# Patient Record
Sex: FEMALE | Race: WHITE
[De-identification: names, ages, dates, MRNs, and addresses within clinical notes are randomized per-mention and may not be internally consistent; named-entity substitution may affect disease eponyms.]

---
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
## 2024-04-01 ENCOUNTER — HOSPITAL ENCOUNTER (INPATIENT)
Facility: HOSPITAL | Age: 19
LOS: 4 days | Discharge: HOME | DRG: 885 | End: 2024-04-05
Attending: PSYCHIATRY & NEUROLOGY | Admitting: PSYCHIATRY & NEUROLOGY
Payer: MEDICAID

## 2024-04-01 ENCOUNTER — HOSPITAL ENCOUNTER (EMERGENCY)
Facility: HOSPITAL | Age: 19
Discharge: PSYCHIATRIC OR SUBSTANCE USE FACILITY | DRG: 885 | End: 2024-04-01
Attending: STUDENT IN AN ORGANIZED HEALTH CARE EDUCATION/TRAINING PROGRAM | Admitting: STUDENT IN AN ORGANIZED HEALTH CARE EDUCATION/TRAINING PROGRAM
Payer: MEDICAID

## 2024-04-01 ENCOUNTER — APPOINTMENT (OUTPATIENT)
Dept: CT IMAGING | Facility: HOSPITAL | Age: 19
DRG: 885 | End: 2024-04-01
Payer: MEDICAID

## 2024-04-01 VITALS
DIASTOLIC BLOOD PRESSURE: 89 MMHG | HEART RATE: 107 BPM | HEIGHT: 65 IN | WEIGHT: 170 LBS | BODY MASS INDEX: 28.32 KG/M2 | TEMPERATURE: 98.4 F | OXYGEN SATURATION: 97 % | RESPIRATION RATE: 17 BRPM | SYSTOLIC BLOOD PRESSURE: 128 MMHG

## 2024-04-01 DIAGNOSIS — F32.89 OTHER DEPRESSION: Primary | ICD-10-CM

## 2024-04-01 PROBLEM — R45.851 SUICIDAL IDEATIONS: Status: ACTIVE | Noted: 2024-04-01

## 2024-04-01 LAB
ALBUMIN SERPL-MCNC: 4.8 G/DL (ref 3.5–5.2)
ALBUMIN/GLOB SERPL: 1.5 G/DL
ALP SERPL-CCNC: 74 U/L (ref 39–117)
ALT SERPL W P-5'-P-CCNC: 29 U/L (ref 1–33)
AMPHET+METHAMPHET UR QL: NEGATIVE
AMPHETAMINES UR QL: NEGATIVE
ANION GAP SERPL CALCULATED.3IONS-SCNC: 14.9 MMOL/L (ref 5–15)
AST SERPL-CCNC: 20 U/L (ref 1–32)
BACTERIA UR QL AUTO: NORMAL /HPF
BARBITURATES UR QL SCN: NEGATIVE
BASOPHILS # BLD AUTO: 0.05 10*3/MM3 (ref 0–0.2)
BASOPHILS NFR BLD AUTO: 0.5 % (ref 0–1.5)
BENZODIAZ UR QL SCN: POSITIVE
BILIRUB SERPL-MCNC: 0.2 MG/DL (ref 0–1.2)
BILIRUB UR QL STRIP: NEGATIVE
BUN SERPL-MCNC: 11 MG/DL (ref 6–20)
BUN/CREAT SERPL: 16.2 (ref 7–25)
BUPRENORPHINE SERPL-MCNC: NEGATIVE NG/ML
CALCIUM SPEC-SCNC: 9.6 MG/DL (ref 8.6–10.5)
CANNABINOIDS SERPL QL: POSITIVE
CHLORIDE SERPL-SCNC: 104 MMOL/L (ref 98–107)
CLARITY UR: CLEAR
CO2 SERPL-SCNC: 22.1 MMOL/L (ref 22–29)
COCAINE UR QL: NEGATIVE
COLOR UR: YELLOW
CREAT SERPL-MCNC: 0.68 MG/DL (ref 0.57–1)
DEPRECATED RDW RBC AUTO: 50.8 FL (ref 37–54)
EGFRCR SERPLBLD CKD-EPI 2021: 128.8 ML/MIN/1.73
EOSINOPHIL # BLD AUTO: 0.04 10*3/MM3 (ref 0–0.4)
EOSINOPHIL NFR BLD AUTO: 0.4 % (ref 0.3–6.2)
ERYTHROCYTE [DISTWIDTH] IN BLOOD BY AUTOMATED COUNT: 15.5 % (ref 12.3–15.4)
ETHANOL BLD-MCNC: <10 MG/DL (ref 0–10)
ETHANOL UR QL: <0.01 %
FENTANYL UR-MCNC: NEGATIVE NG/ML
GLOBULIN UR ELPH-MCNC: 3.1 GM/DL
GLUCOSE BLDC GLUCOMTR-MCNC: 131 MG/DL (ref 70–130)
GLUCOSE SERPL-MCNC: 124 MG/DL (ref 65–99)
GLUCOSE UR STRIP-MCNC: NEGATIVE MG/DL
HAV IGM SERPL QL IA: NORMAL
HBV CORE IGM SERPL QL IA: NORMAL
HBV SURFACE AG SERPL QL IA: NORMAL
HCG SERPL QL: NEGATIVE
HCT VFR BLD AUTO: 39.3 % (ref 34–46.6)
HCV AB SER DONR QL: NORMAL
HGB BLD-MCNC: 12.5 G/DL (ref 12–15.9)
HGB UR QL STRIP.AUTO: ABNORMAL
HYALINE CASTS UR QL AUTO: NORMAL /LPF
IMM GRANULOCYTES # BLD AUTO: 0.03 10*3/MM3 (ref 0–0.05)
IMM GRANULOCYTES NFR BLD AUTO: 0.3 % (ref 0–0.5)
KETONES UR QL STRIP: NEGATIVE
LEUKOCYTE ESTERASE UR QL STRIP.AUTO: NEGATIVE
LYMPHOCYTES # BLD AUTO: 1.55 10*3/MM3 (ref 0.7–3.1)
LYMPHOCYTES NFR BLD AUTO: 14.7 % (ref 19.6–45.3)
MAGNESIUM SERPL-MCNC: 1.9 MG/DL (ref 1.7–2.2)
MCH RBC QN AUTO: 28.3 PG (ref 26.6–33)
MCHC RBC AUTO-ENTMCNC: 31.8 G/DL (ref 31.5–35.7)
MCV RBC AUTO: 89.1 FL (ref 79–97)
METHADONE UR QL SCN: NEGATIVE
MONOCYTES # BLD AUTO: 0.51 10*3/MM3 (ref 0.1–0.9)
MONOCYTES NFR BLD AUTO: 4.8 % (ref 5–12)
NEUTROPHILS NFR BLD AUTO: 79.3 % (ref 42.7–76)
NEUTROPHILS NFR BLD AUTO: 8.37 10*3/MM3 (ref 1.7–7)
NITRITE UR QL STRIP: NEGATIVE
NRBC BLD AUTO-RTO: 0 /100 WBC (ref 0–0.2)
OPIATES UR QL: NEGATIVE
OXYCODONE UR QL SCN: NEGATIVE
PCP UR QL SCN: NEGATIVE
PH UR STRIP.AUTO: 6 [PH] (ref 5–8)
PLATELET # BLD AUTO: 454 10*3/MM3 (ref 140–450)
PMV BLD AUTO: 9.8 FL (ref 6–12)
POTASSIUM SERPL-SCNC: 3.2 MMOL/L (ref 3.5–5.2)
PROT SERPL-MCNC: 7.9 G/DL (ref 6–8.5)
PROT UR QL STRIP: NEGATIVE
RBC # BLD AUTO: 4.41 10*6/MM3 (ref 3.77–5.28)
RBC # UR STRIP: NORMAL /HPF
REF LAB TEST METHOD: NORMAL
SODIUM SERPL-SCNC: 141 MMOL/L (ref 136–145)
SP GR UR STRIP: 1.02 (ref 1–1.03)
SQUAMOUS #/AREA URNS HPF: NORMAL /HPF
TRICYCLICS UR QL SCN: NEGATIVE
UROBILINOGEN UR QL STRIP: ABNORMAL
WBC # UR STRIP: NORMAL /HPF
WBC NRBC COR # BLD AUTO: 10.55 10*3/MM3 (ref 3.4–10.8)

## 2024-04-01 PROCEDURE — 83036 HEMOGLOBIN GLYCOSYLATED A1C: CPT | Performed by: PSYCHIATRY & NEUROLOGY

## 2024-04-01 PROCEDURE — 80307 DRUG TEST PRSMV CHEM ANLYZR: CPT | Performed by: STUDENT IN AN ORGANIZED HEALTH CARE EDUCATION/TRAINING PROGRAM

## 2024-04-01 PROCEDURE — 82077 ASSAY SPEC XCP UR&BREATH IA: CPT | Performed by: STUDENT IN AN ORGANIZED HEALTH CARE EDUCATION/TRAINING PROGRAM

## 2024-04-01 PROCEDURE — 93010 ELECTROCARDIOGRAM REPORT: CPT | Performed by: SPECIALIST

## 2024-04-01 PROCEDURE — 81001 URINALYSIS AUTO W/SCOPE: CPT | Performed by: STUDENT IN AN ORGANIZED HEALTH CARE EDUCATION/TRAINING PROGRAM

## 2024-04-01 PROCEDURE — 80053 COMPREHEN METABOLIC PANEL: CPT | Performed by: STUDENT IN AN ORGANIZED HEALTH CARE EDUCATION/TRAINING PROGRAM

## 2024-04-01 PROCEDURE — 83735 ASSAY OF MAGNESIUM: CPT | Performed by: STUDENT IN AN ORGANIZED HEALTH CARE EDUCATION/TRAINING PROGRAM

## 2024-04-01 PROCEDURE — 80074 ACUTE HEPATITIS PANEL: CPT | Performed by: PSYCHIATRY & NEUROLOGY

## 2024-04-01 PROCEDURE — 84443 ASSAY THYROID STIM HORMONE: CPT | Performed by: PSYCHIATRY & NEUROLOGY

## 2024-04-01 PROCEDURE — 82948 REAGENT STRIP/BLOOD GLUCOSE: CPT

## 2024-04-01 PROCEDURE — 84703 CHORIONIC GONADOTROPIN ASSAY: CPT | Performed by: STUDENT IN AN ORGANIZED HEALTH CARE EDUCATION/TRAINING PROGRAM

## 2024-04-01 PROCEDURE — 99285 EMERGENCY DEPT VISIT HI MDM: CPT

## 2024-04-01 PROCEDURE — 85025 COMPLETE CBC W/AUTO DIFF WBC: CPT | Performed by: STUDENT IN AN ORGANIZED HEALTH CARE EDUCATION/TRAINING PROGRAM

## 2024-04-01 PROCEDURE — 70450 CT HEAD/BRAIN W/O DYE: CPT | Performed by: RADIOLOGY

## 2024-04-01 PROCEDURE — 93005 ELECTROCARDIOGRAM TRACING: CPT | Performed by: PSYCHIATRY & NEUROLOGY

## 2024-04-01 PROCEDURE — 36415 COLL VENOUS BLD VENIPUNCTURE: CPT

## 2024-04-01 PROCEDURE — 70450 CT HEAD/BRAIN W/O DYE: CPT

## 2024-04-01 RX ORDER — CEFDINIR 300 MG/1
300 CAPSULE ORAL 2 TIMES DAILY
COMMUNITY
End: 2024-04-05 | Stop reason: HOSPADM

## 2024-04-01 RX ORDER — HYDROXYZINE HYDROCHLORIDE 25 MG/1
25 TABLET, FILM COATED ORAL DAILY PRN
Status: CANCELLED | OUTPATIENT
Start: 2024-04-01

## 2024-04-01 RX ORDER — ACETAMINOPHEN 325 MG/1
650 TABLET ORAL EVERY 6 HOURS PRN
Status: DISCONTINUED | OUTPATIENT
Start: 2024-04-01 | End: 2024-04-05 | Stop reason: HOSPADM

## 2024-04-01 RX ORDER — LOPERAMIDE HYDROCHLORIDE 2 MG/1
2 CAPSULE ORAL
Status: DISCONTINUED | OUTPATIENT
Start: 2024-04-01 | End: 2024-04-05 | Stop reason: HOSPADM

## 2024-04-01 RX ORDER — ONDANSETRON 4 MG/1
4 TABLET, ORALLY DISINTEGRATING ORAL EVERY 6 HOURS PRN
Status: DISCONTINUED | OUTPATIENT
Start: 2024-04-01 | End: 2024-04-05 | Stop reason: HOSPADM

## 2024-04-01 RX ORDER — CEFDINIR 300 MG/1
300 CAPSULE ORAL 2 TIMES DAILY
Status: CANCELLED | OUTPATIENT
Start: 2024-04-01 | End: 2024-04-08

## 2024-04-01 RX ORDER — ALUMINA, MAGNESIA, AND SIMETHICONE 2400; 2400; 240 MG/30ML; MG/30ML; MG/30ML
15 SUSPENSION ORAL EVERY 6 HOURS PRN
Status: DISCONTINUED | OUTPATIENT
Start: 2024-04-01 | End: 2024-04-05 | Stop reason: HOSPADM

## 2024-04-01 RX ORDER — POTASSIUM CHLORIDE 20 MEQ/1
40 TABLET, EXTENDED RELEASE ORAL ONCE
Status: COMPLETED | OUTPATIENT
Start: 2024-04-01 | End: 2024-04-01

## 2024-04-01 RX ORDER — HYDROXYZINE 50 MG/1
50 TABLET, FILM COATED ORAL EVERY 6 HOURS PRN
Status: DISCONTINUED | OUTPATIENT
Start: 2024-04-01 | End: 2024-04-05 | Stop reason: HOSPADM

## 2024-04-01 RX ORDER — NICOTINE 21 MG/24HR
1 PATCH, TRANSDERMAL 24 HOURS TRANSDERMAL
Status: DISCONTINUED | OUTPATIENT
Start: 2024-04-01 | End: 2024-04-05 | Stop reason: HOSPADM

## 2024-04-01 RX ORDER — SERTRALINE HYDROCHLORIDE 25 MG/1
25 TABLET, FILM COATED ORAL EVERY MORNING
COMMUNITY
End: 2024-04-05 | Stop reason: HOSPADM

## 2024-04-01 RX ORDER — HYDROXYZINE PAMOATE 25 MG/1
25 CAPSULE ORAL DAILY PRN
COMMUNITY

## 2024-04-01 RX ORDER — ECHINACEA PURPUREA EXTRACT 125 MG
2 TABLET ORAL AS NEEDED
Status: DISCONTINUED | OUTPATIENT
Start: 2024-04-01 | End: 2024-04-05 | Stop reason: HOSPADM

## 2024-04-01 RX ORDER — BENZONATATE 100 MG/1
100 CAPSULE ORAL 3 TIMES DAILY PRN
Status: DISCONTINUED | OUTPATIENT
Start: 2024-04-01 | End: 2024-04-05 | Stop reason: HOSPADM

## 2024-04-01 RX ORDER — FAMOTIDINE 20 MG/1
20 TABLET, FILM COATED ORAL 2 TIMES DAILY PRN
Status: DISCONTINUED | OUTPATIENT
Start: 2024-04-01 | End: 2024-04-05 | Stop reason: HOSPADM

## 2024-04-01 RX ORDER — CETIRIZINE HYDROCHLORIDE 10 MG/1
10 TABLET ORAL DAILY PRN
COMMUNITY

## 2024-04-01 RX ORDER — TRAZODONE HYDROCHLORIDE 50 MG/1
50 TABLET ORAL NIGHTLY PRN
Status: DISCONTINUED | OUTPATIENT
Start: 2024-04-01 | End: 2024-04-05 | Stop reason: HOSPADM

## 2024-04-01 RX ORDER — IBUPROFEN 400 MG/1
400 TABLET ORAL EVERY 6 HOURS PRN
Status: DISCONTINUED | OUTPATIENT
Start: 2024-04-01 | End: 2024-04-05 | Stop reason: HOSPADM

## 2024-04-01 RX ORDER — BENZTROPINE MESYLATE 1 MG/ML
1 INJECTION INTRAMUSCULAR; INTRAVENOUS ONCE AS NEEDED
Status: DISCONTINUED | OUTPATIENT
Start: 2024-04-01 | End: 2024-04-05 | Stop reason: HOSPADM

## 2024-04-01 RX ORDER — CETIRIZINE HYDROCHLORIDE 10 MG/1
10 TABLET ORAL DAILY PRN
Status: DISCONTINUED | OUTPATIENT
Start: 2024-04-01 | End: 2024-04-05 | Stop reason: HOSPADM

## 2024-04-01 RX ORDER — BENZTROPINE MESYLATE 1 MG/1
2 TABLET ORAL ONCE AS NEEDED
Status: DISCONTINUED | OUTPATIENT
Start: 2024-04-01 | End: 2024-04-05 | Stop reason: HOSPADM

## 2024-04-01 RX ADMIN — TRAZODONE HYDROCHLORIDE 50 MG: 50 TABLET ORAL at 20:46

## 2024-04-01 RX ADMIN — HYDROXYZINE HYDROCHLORIDE 50 MG: 50 TABLET ORAL at 20:46

## 2024-04-01 RX ADMIN — NICOTINE TRANSDERMAL SYSTEM 1 PATCH: 21 PATCH, EXTENDED RELEASE TRANSDERMAL at 20:46

## 2024-04-01 RX ADMIN — POTASSIUM CHLORIDE 40 MEQ: 1500 TABLET, EXTENDED RELEASE ORAL at 16:35

## 2024-04-01 NOTE — NURSING NOTE
"Pt assessment complete     Pt brought in today by   AARON VELASCO (Mother)  [phone] (Home Phone)   By the recommendation of "[employer]".    Pt is a poor historian with a response lag, and had difficulty focusing on questions that I am asking her during assessment asking me to repeat them multiple times.    Pt reports SI with thoughts of \"using a razor.\"  Pt denies an intent but states \" I am afraid I will act on them.\"   Pt also reports thoughts of hurting others with a knife but states \"no one in specific\" also thinking of using the razor.     Pt states on Tuesday she used a delta 8 pen and that she has not been the same since.   Pt reports she uses marijuana daily.     Pts mother states she took her to ED on Thursday because she was freaking out and having tingling on her head.   She states Friday her futire in mother in law found her in the bathroom having a meltdown in the floor and got her in to [facility] and they switched her medicine from lexapro to zoloft.   Pts mother states she expressed to her that she was having intrusive thoughts and was afraid she was going to hurt herself so she had family remove razors out of shower.   Pts mother states she has been terrified 'to be alone and that all she could think about was \"kill.\"   Poor appetite   Poor sleep   Flashbacks from past trauma   Tearful, and increased anxiety attacks.   Anxiety 10   Depression 8  Pts mother states that her family gave her a klonopin during an anxiety attack but pt states  that it was only a one time use.   "

## 2024-04-02 LAB
HBA1C MFR BLD: 5.1 % (ref 4.8–5.6)
TSH SERPL DL<=0.05 MIU/L-ACNC: 0.96 UIU/ML (ref 0.27–4.2)

## 2024-04-02 PROCEDURE — 99223 1ST HOSP IP/OBS HIGH 75: CPT | Performed by: PSYCHIATRY & NEUROLOGY

## 2024-04-02 RX ORDER — HYDROXYZINE 50 MG/1
50 TABLET, FILM COATED ORAL ONCE
Status: COMPLETED | OUTPATIENT
Start: 2024-04-02 | End: 2024-04-02

## 2024-04-02 RX ORDER — OLANZAPINE 5 MG/1
5 TABLET ORAL NIGHTLY
Status: DISCONTINUED | OUTPATIENT
Start: 2024-04-02 | End: 2024-04-05 | Stop reason: HOSPADM

## 2024-04-02 RX ADMIN — HYDROXYZINE HYDROCHLORIDE 50 MG: 50 TABLET ORAL at 12:46

## 2024-04-02 RX ADMIN — SERTRALINE 25 MG: 50 TABLET, FILM COATED ORAL at 06:31

## 2024-04-02 RX ADMIN — HYDROXYZINE HYDROCHLORIDE 50 MG: 50 TABLET ORAL at 19:25

## 2024-04-02 RX ADMIN — HYDROXYZINE HYDROCHLORIDE 50 MG: 50 TABLET ORAL at 01:52

## 2024-04-02 RX ADMIN — ACETAMINOPHEN 650 MG: 325 TABLET ORAL at 12:46

## 2024-04-02 RX ADMIN — OLANZAPINE 5 MG: 5 TABLET, FILM COATED ORAL at 20:24

## 2024-04-02 RX ADMIN — NICOTINE TRANSDERMAL SYSTEM 1 PATCH: 21 PATCH, EXTENDED RELEASE TRANSDERMAL at 11:44

## 2024-04-02 RX ADMIN — TRAZODONE HYDROCHLORIDE 50 MG: 50 TABLET ORAL at 20:24

## 2024-04-02 RX ADMIN — HYDROXYZINE HYDROCHLORIDE 50 MG: 50 TABLET ORAL at 06:31
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
## 2024-04-02 NOTE — NURSING NOTE
Pt came to the nurses station and stated she was feeling dizzy, staff assisted her to a seat and got her vital signs and glucose check. Pt stated she had been having these spells for a few weeks and did not know why. Pt stated she had been having panic attacks and that could of been it. Pt was assisted back to her room and helped to bed and educated on the importance of not getting out of bed without the assistance and she verbalized her understanding.

## 2024-04-02 NOTE — CONSULTS
Chief complaint Problems with IUD, h/o ovarian cyst      .     History of present illness:  Pt had Kyleena IUD placed on 11/1/23. Pt states immediately after IUD placement she noticed a decline in her mental health. Pt states she had increased depression and mood swings. Pt admitted to psych currently due to SI. Pt has h/o ovarian cyst and still has occasional pelvic pain. Pt was supposed to f/u one month ago for cyst but pt didn't f/u.    Review of Systems  Pertinent items are noted in HPI, all other systems reviewed and negative    History  Past Medical History:   Diagnosis Date    Anxiety     Depression     Suicidal thoughts    ,   Past Surgical History:   Procedure Laterality Date    TONSILLECTOMY      WISDOM TOOTH EXTRACTION     , History reviewed. No pertinent family history.,   Social History     Socioeconomic History    Marital status: Single     Spouse name: denies    Number of children: 0    Years of education: 12th    Highest education level: High school graduate   Tobacco Use    Smoking status: Never     Passive exposure: Never    Smokeless tobacco: Never   Vaping Use    Vaping status: Every Day    Substances: Nicotine, THC, CBD, Flavoring    Devices: Disposable, Pre-filled or refillable cartridge, Refillable tank, Pre-filled pod    Passive vaping exposure: Yes   Substance and Sexual Activity    Alcohol use: Not Currently     Comment: very occasionally    Drug use: Yes     Types: Marijuana    Sexual activity: Yes     Partners: Male     Birth control/protection: I.U.D.     E-cigarette/Vaping    E-cigarette/Vaping Use Current Every Day User     Passive Exposure Yes     Counseling Given Yes      E-cigarette/Vaping Substances    Nicotine Yes     THC Yes     CBD Yes     Flavoring Yes      E-cigarette/Vaping Devices    Disposable Yes     Pre-filled or Refillable Cartridge Yes     Refillable Tank Yes     Pre-filled Pod Yes          ,   Medications Prior to Admission   Medication Sig Dispense Refill Last  Dose    cefdinir (OMNICEF) 300 MG capsule Take 1 capsule by mouth 2 (Two) Times a Day. For UTI   3/26/2024    cetirizine (zyrTEC) 10 MG tablet Take 1 tablet by mouth Daily As Needed for Allergies.   Past Week    hydrOXYzine pamoate (VISTARIL) 25 MG capsule Take 1 capsule by mouth Daily As Needed for Anxiety (panic attacks).   Past Week    sertraline (ZOLOFT) 25 MG tablet Take 1 tablet by mouth Every Morning.   4/1/2024   , Scheduled Meds:  nicotine, 1 patch, Transdermal, Q24H  OLANZapine, 5 mg, Oral, Nightly  [START ON 4/3/2024] sertraline, 50 mg, Oral, QAM   , Continuous Infusions:   , PRN Meds:    acetaminophen    aluminum-magnesium hydroxide-simethicone    benzonatate    benztropine **OR** benztropine    cetirizine    famotidine    hydrOXYzine    ibuprofen    loperamide    magnesium hydroxide    ondansetron ODT    sodium chloride    traZODone, and Allergies:  Patient has no known allergies.          Vital Signs   Temp:    Pulse:    Resp:    BP:     Physical Exam:     General Appearance:  Alert, cooperative, in no acute distress   Head:  Normocephalic, without obvious abnormality, atraumatic   Eyes:  Lids and lashes normal, conjunctivae and sclerae normal, no icterus, no pallor, corneas clear, PERRLA   Ears:  Ears appear intact with no abnormalities noted   Throat:  No oral lesions, no thrush, oral mucosa moist   Neck:  No adenopathy, supple, trachea midline, no thyromegaly, no carotid bruit, no JVD   Back:  No kyphosis present, no scoliosis present, no skin lesions, erythema or scars, no tenderness to percussion, or palpation, range of motion normal   Lungs:  Clear to auscultation,respirations regular, even and unlabored    Heart:  Regular rhythm and normal rate, normal S1 and S2, no murmur, no gallop, no rub, no click   Breast Exam:  Deferred   Abdomen:  Normal bowel sounds, no masses, no organomegaly, soft non-tender, non-distended, no guarding, no rebound tenderness   Genitalia:  Deferred   Extremities:  Moves  all extremities well, no edema, no cyanosis, no redness   Pulses:  Pulses palpable and equal bilaterally   Skin:  No bleeding, bruising or rash   Lymph nodes:  No palpable adenopathy   Neurologic:  Cranial nerves 2 - 12 grossly intact, sensation intact, DTR present and equal bilaterally       Results Review:   I reviewed the patient's new clinical results.            Active Problems:          Suicidal ideations  Desires IUD removal-Discussed R/B/A with pt and alternative methods of BC. IUD removed without difficulty. Pt considering Paragard IUD.  H/o ovarian cyst-will order TVUS.    I discussed the patients findings and my recommendations with patient and nursing staff    Shasta Horner DO  04/02/2024  1630 PM

## 2024-04-02 NOTE — ED PROVIDER NOTES
"Subjective   History of Present Illness  Pt is anxious, has been acting different for several days. Recent medications change.    She states Friday her futire in mother in law found her in the bathroom having a meltdown in the floor and got her in to [facility] and they switched her medicine from lexapro to zoloft.   Pts mother states she expressed to her that she was having intrusive thoughts and was afraid she was going to hurt herself so she had family remove razors out of shower.   Pts mother states she has been terrified 'to be alone and that all she could think about was \"kill.\"      History provided by:  Parent      Review of Systems    Past Medical History:   Diagnosis Date    Anxiety     Depression     Suicidal thoughts        No Known Allergies    Past Surgical History:   Procedure Laterality Date    TONSILLECTOMY      WISDOM TOOTH EXTRACTION         History reviewed. No pertinent family history.    Social History     Socioeconomic History    Marital status: Single     Spouse name: denies    Number of children: 0    Years of education: 12th    Highest education level: High school graduate   Tobacco Use    Smoking status: Never     Passive exposure: Never    Smokeless tobacco: Never   Vaping Use    Vaping status: Every Day    Substances: Nicotine, THC, CBD, Flavoring    Devices: Disposable, Pre-filled or refillable cartridge, Refillable tank, Pre-filled pod    Passive vaping exposure: Yes   Substance and Sexual Activity    Alcohol use: Not Currently     Comment: very occasionally    Drug use: Yes     Types: Marijuana    Sexual activity: Yes     Partners: Male     Birth control/protection: I.U.D.           Objective   Physical Exam  Vitals and nursing note reviewed.   Constitutional:       Appearance: She is well-developed.   HENT:      Head: Normocephalic.   Cardiovascular:      Rate and Rhythm: Normal rate and regular rhythm.   Pulmonary:      Effort: Pulmonary effort is normal.      Breath sounds: " Normal breath sounds.   Abdominal:      General: Bowel sounds are normal.      Palpations: Abdomen is soft.      Tenderness: There is no abdominal tenderness.   Musculoskeletal:         General: Normal range of motion.      Cervical back: Neck supple.   Skin:     General: Skin is warm and dry.   Neurological:      Mental Status: She is alert and oriented to person, place, and time.   Psychiatric:         Behavior: Behavior normal.         Thought Content: Thought content normal.         Judgment: Judgment normal.         Procedures           ED Course      Results for orders placed or performed during the hospital encounter of 04/01/24   hCG, Serum, Qualitative    Specimen: Blood   Result Value Ref Range    HCG Qualitative Negative Negative   Comprehensive Metabolic Panel    Specimen: Blood   Result Value Ref Range    Glucose 124 (H) 65 - 99 mg/dL    BUN 11 6 - 20 mg/dL    Creatinine 0.68 0.57 - 1.00 mg/dL    Sodium 141 136 - 145 mmol/L    Potassium 3.2 (L) 3.5 - 5.2 mmol/L    Chloride 104 98 - 107 mmol/L    CO2 22.1 22.0 - 29.0 mmol/L    Calcium 9.6 8.6 - 10.5 mg/dL    Total Protein 7.9 6.0 - 8.5 g/dL    Albumin 4.8 3.5 - 5.2 g/dL    ALT (SGPT) 29 1 - 33 U/L    AST (SGOT) 20 1 - 32 U/L    Alkaline Phosphatase 74 39 - 117 U/L    Total Bilirubin 0.2 0.0 - 1.2 mg/dL    Globulin 3.1 gm/dL    A/G Ratio 1.5 g/dL    BUN/Creatinine Ratio 16.2 7.0 - 25.0    Anion Gap 14.9 5.0 - 15.0 mmol/L    eGFR 128.8 >60.0 mL/min/1.73   Urinalysis With Microscopic If Indicated (No Culture) - Urine, Clean Catch    Specimen: Urine, Clean Catch   Result Value Ref Range    Color, UA Yellow Yellow, Straw    Appearance, UA Clear Clear    pH, UA 6.0 5.0 - 8.0    Specific Gravity, UA 1.017 1.005 - 1.030    Glucose, UA Negative Negative    Ketones, UA Negative Negative    Bilirubin, UA Negative Negative    Blood, UA Small (1+) (A) Negative    Protein, UA Negative Negative    Leuk Esterase, UA Negative Negative    Nitrite, UA Negative Negative     Urobilinogen, UA 0.2 E.U./dL 0.2 - 1.0 E.U./dL   Urine Drug Screen - Urine, Clean Catch    Specimen: Urine, Clean Catch   Result Value Ref Range    THC, Screen, Urine Positive (A) Negative    Phencyclidine (PCP), Urine Negative Negative    Cocaine Screen, Urine Negative Negative    Methamphetamine, Ur Negative Negative    Opiate Screen Negative Negative    Amphetamine Screen, Urine Negative Negative    Benzodiazepine Screen, Urine Positive (A) Negative    Tricyclic Antidepressants Screen Negative Negative    Methadone Screen, Urine Negative Negative    Barbiturates Screen, Urine Negative Negative    Oxycodone Screen, Urine Negative Negative    Buprenorphine, Screen, Urine Negative Negative   Magnesium    Specimen: Blood   Result Value Ref Range    Magnesium 1.9 1.7 - 2.2 mg/dL   Ethanol    Specimen: Blood   Result Value Ref Range    Ethanol <10 0 - 10 mg/dL    Ethanol % <0.010 %   CBC Auto Differential    Specimen: Blood   Result Value Ref Range    WBC 10.55 3.40 - 10.80 10*3/mm3    RBC 4.41 3.77 - 5.28 10*6/mm3    Hemoglobin 12.5 12.0 - 15.9 g/dL    Hematocrit 39.3 34.0 - 46.6 %    MCV 89.1 79.0 - 97.0 fL    MCH 28.3 26.6 - 33.0 pg    MCHC 31.8 31.5 - 35.7 g/dL    RDW 15.5 (H) 12.3 - 15.4 %    RDW-SD 50.8 37.0 - 54.0 fl    MPV 9.8 6.0 - 12.0 fL    Platelets 454 (H) 140 - 450 10*3/mm3    Neutrophil % 79.3 (H) 42.7 - 76.0 %    Lymphocyte % 14.7 (L) 19.6 - 45.3 %    Monocyte % 4.8 (L) 5.0 - 12.0 %    Eosinophil % 0.4 0.3 - 6.2 %    Basophil % 0.5 0.0 - 1.5 %    Immature Grans % 0.3 0.0 - 0.5 %    Neutrophils, Absolute 8.37 (H) 1.70 - 7.00 10*3/mm3    Lymphocytes, Absolute 1.55 0.70 - 3.10 10*3/mm3    Monocytes, Absolute 0.51 0.10 - 0.90 10*3/mm3    Eosinophils, Absolute 0.04 0.00 - 0.40 10*3/mm3    Basophils, Absolute 0.05 0.00 - 0.20 10*3/mm3    Immature Grans, Absolute 0.03 0.00 - 0.05 10*3/mm3    nRBC 0.0 0.0 - 0.2 /100 WBC   Fentanyl, Urine - Urine, Clean Catch    Specimen: Urine, Clean Catch   Result Value Ref  Range    Fentanyl, Urine Negative Negative   Urinalysis, Microscopic Only - Urine, Clean Catch    Specimen: Urine, Clean Catch   Result Value Ref Range    RBC, UA 0-2 None Seen, 0-2 /HPF    WBC, UA 0-2 None Seen, 0-2 /HPF    Bacteria, UA None Seen None Seen /HPF    Squamous Epithelial Cells, UA 0-2 None Seen, 0-2 /HPF    Hyaline Casts, UA None Seen None Seen /LPF    Methodology Automated Microscopy       CT Head Without Contrast   Final Result     Unremarkable exam demonstrating no CT evidence of acute intracranial   findings.           This report was finalized on 4/1/2024 4:07 PM by Dr. Farhan Gonzalez MD.                                                  Medical Decision Making  Pt has depression, change of med recently   Eval by intake and admitted to psych     Problems Addressed:  Other depression: complicated acute illness or injury    Amount and/or Complexity of Data Reviewed  Radiology: ordered.    Risk  Prescription drug management.        Final diagnoses:   Other depression       ED Disposition  ED Disposition       ED Disposition   DC/Transfer to Behavioral Health    Condition   Stable    Comment   --               No follow-up provider specified.       Medication List      No changes were made to your prescriptions during this visit.            Avelina Reveles PA  04/02/24 0636
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
## 2024-04-02 NOTE — NURSING NOTE
During rounding staff noticed patient sitting up in bed and when they checked on patient she stated she just felt really jittery and didn't know what was going on with her, vital signs were taken and BP was 121/74 and . Dr Camacho was called and she gave a one time order for Vistaril 50 mg and if no improvement in 1 hour to repeat the EKG.

## 2024-04-03 ENCOUNTER — APPOINTMENT (OUTPATIENT)
Dept: ULTRASOUND IMAGING | Facility: HOSPITAL | Age: 19
DRG: 885 | End: 2024-04-03
Payer: MEDICAID

## 2024-04-03 LAB
QT INTERVAL: 358 MS
QTC INTERVAL: 440 MS

## 2024-04-03 PROCEDURE — 76830 TRANSVAGINAL US NON-OB: CPT | Performed by: RADIOLOGY

## 2024-04-03 PROCEDURE — 99232 SBSQ HOSP IP/OBS MODERATE 35: CPT | Performed by: PSYCHIATRY & NEUROLOGY

## 2024-04-03 PROCEDURE — 76830 TRANSVAGINAL US NON-OB: CPT

## 2024-04-03 RX ADMIN — TRAZODONE HYDROCHLORIDE 50 MG: 50 TABLET ORAL at 23:38

## 2024-04-03 RX ADMIN — NICOTINE TRANSDERMAL SYSTEM 1 PATCH: 21 PATCH, EXTENDED RELEASE TRANSDERMAL at 08:30

## 2024-04-03 RX ADMIN — SERTRALINE 50 MG: 50 TABLET, FILM COATED ORAL at 06:13

## 2024-04-03 RX ADMIN — OLANZAPINE 5 MG: 5 TABLET, FILM COATED ORAL at 20:11

## 2024-04-03 RX ADMIN — ACETAMINOPHEN 650 MG: 325 TABLET ORAL at 15:53

## 2024-04-03 NOTE — PROGRESS NOTES
"INPATIENT PSYCHIATRIC PROGRESS NOTE    Name:  Tamara Burger  :  2005  MRN:  [id number]  Visit Number:  [id number]  Length of stay:  2    SUBJECTIVE    CC/Focus of Exam: SI, bipolar disorder    INTERVAL HISTORY:  The patient reports she is feeling some better. She states she has anxiety and is also experiencing nightmares.   Depression rating 1/10  Anxiety rating 4/10  Sleep: good  Withdrawal sx: None  Cravin/10    Review of Systems   HENT: Negative.     Respiratory: Negative.     Cardiovascular: Negative.    Gastrointestinal: Negative.        OBJECTIVE    Temp:  [97.3 °F (36.3 °C)-98.3 °F (36.8 °C)] 97.3 °F (36.3 °C)  Heart Rate:  [] 87  Resp:  [16-18] 18  BP: (117-137)/(67-87) 117/67    MENTAL STATUS EXAM:  Appearance:Casually dressed, good hygeine.   Cooperation:Cooperative  Psychomotor: No psychomotor agitation/retardation, No EPS, No motor tics  Speech-normal rate, amount.  Mood \"better\"   Affect-congruent, appropriate, stable  Thought Content-goal directed, no delusional material present  Thought process-linear, organized.  Suicidality: No SI  Homicidality: No HI  Perception: No AH/VH  Insight-fair   Judgement-fair    Lab Results (last 24 hours)       ** No results found for the last 24 hours. **               Imaging Results (Last 24 Hours)       Procedure Component Value Units Date/Time    US Non-ob Transvaginal [[id number]] Collected: 24 1350     Updated: 24 1353    Narrative:      EXAM:    US Pelvis Transabdominal and Transvaginal, Complete     EXAM DATE:    4/3/2024 1:33 PM     CLINICAL HISTORY:    pelvic pain, h/o ovarian cyst     TECHNIQUE:    Real-time complete transabdominal and transvaginal pelvic ultrasound  with image documentation.  Transvaginal imaging was used for better  evaluation of the endometrium and adnexa.     COMPARISON:    No relevant prior studies available.     FINDINGS:    UTERUS/CERVIX:  Unremarkable as visualized.  No myometrial mass.  The  uterus " measures 5 x 2.5 x 3.5 cm.  The endometrial stripe measures 0.29  cm in thickness.    RIGHT OVARY:  Probable dermoid of the right ovary measuring 2.8 cm.  With some scattered specular echoes.  Normal blood flow.    LEFT OVARY:  Unremarkable as visualized.  Normal blood flow.  The left  ovary measures 2.1 x 1.6 cm.    FREE FLUID:  No free fluid.    BLADDER:  Unremarkable as visualized.  Wall is normal thickness for  degree of distention.       Impression:        Probable dermoid of the right ovary measuring 2.8 cm. With some  scattered specular echoes.        This report was finalized on 4/3/2024 1:51 PM by Dr. Farhan Gonzalez MD.                  ECG/EMG Results (most recent)       Procedure Component Value Units Date/Time    ECG 12 Lead Other; Baseline Cardiac Status [[id number]] Collected: 04/01/24 2119     Updated: 04/03/24 1142     QT Interval 358 ms      QTC Interval 440 ms     Narrative:      Test Reason : Other~  Blood Pressure :   */*   mmHG  Vent. Rate :  91 BPM     Atrial Rate :  91 BPM     P-R Int : 132 ms          QRS Dur :  84 ms      QT Int : 358 ms       P-R-T Axes :  39  81  57 degrees     QTc Int : 440 ms    Normal sinus rhythm  Normal ECG  No previous ECGs available  Confirmed by Loyd Naranjo (2028) on 4/3/2024 11:41:44 AM    Referred By:            Confirmed By: Loyd Naranjo             ALLERGIES: Patient has no known allergies.    Medication Review:   Scheduled Medications:  nicotine, 1 patch, Transdermal, Q24H  OLANZapine, 5 mg, Oral, Nightly  sertraline, 50 mg, Oral, QAM         PRN Medications:    acetaminophen    aluminum-magnesium hydroxide-simethicone    benzonatate    benztropine **OR** benztropine    cetirizine    famotidine    hydrOXYzine    ibuprofen    loperamide    magnesium hydroxide    ondansetron ODT    sodium chloride    traZODone   All medications reviewed.    ASSESSMENT & PLAN:    Suicidal Ideation  -Recent concern for self-harm & suicide. Pt minimizing today, but family brought  patient in due to safety concerns  -Admit for crisis stabilization  -SP3     Bipolar affective disorder, severe, recurrent, with possible psychosis  -Rule out MDD, substance induced d/o, anxiety d/o, side efx of contraception  -Began olanzapine 5mg nightly on 4/2/24.  -Increased sertraline to 50mg daily on 4/2/24.   -We will establish outpatient psychiatric care following hospitalization     Post Traumatic Stress Disorder  -Pt with hx of significant trauma, likely contributing to ongoing symptom burden  -Meds as above  -Outpatient care as above     Cannabis use disorder, severe, dependence  -Admission UDS positive  -Supportive care  -Ascertain substance abuse treatment plans following discharge     Hyperglycemia  -Admission glucose 124  -A1c is 5.1     Contraceptive Therapy/Ovarian Cyst  -Appreciate OBGYN consult, IUD has been removed.     Special precautions: Special Precautions Level 3 (q15 min checks) .    Behavioral Health Treatment Plan and Problem List: I have reviewed and approved the Behavioral Health Treatment Plan and Problem list.  The patient has had a chance to review and agrees with the treatment plan.    Copied text in portions of this note has been reviewed and is accurate as of 04/03/24         Clinician:  Phyllis Gallagher MD  04/03/24  14:17 EDT

## 2024-04-03 NOTE — PLAN OF CARE
"Goal Outcome Evaluation:  Plan of Care Reviewed With: patient  Patient Agreement with Plan of Care: agrees     Progress: improving  Outcome Evaluation: Patient cooperative during assessment, reported a fair appetite and difficulty with sleep, stating that she felt like she has sleep paralysis. Denied SI/HI/AVH. Anxiety 10. Patient reports occassional thoughts of cutting her wrist, stating she doesn't want to do it and has never done it before but is concerned she will. Patient ensured staff she would seek them out if these thoughts returned. Patient reports her emotions have been \"all over the place\". She is concerned that the removal of her IUD is causing further hormone imbalance. No acute s/s of distress noted.                               "

## 2024-04-03 NOTE — PLAN OF CARE
Goal Outcome Evaluation:  Plan of Care Reviewed With: patient  Patient Agreement with Plan of Care: agrees     Progress: improving  Outcome Evaluation: Patient calm and cooperative. Rates anxiety a 8 and depression a 2. Denies SI/HI or AVH. Patient had a transvaginal ultrasound today r/t history of ovarian cysts.

## 2024-04-03 NOTE — DISCHARGE INSTR - APPOINTMENTS
[facility]  [address]  [place] [address]   [phone]  **[facility]    April 6 at 9:00am with Deysi         [facility]  [address]  [place] [address]  [phone]    May 6 at 9:00am with Cheyanne   May 21 at 8:45am with Duyen

## 2024-04-03 NOTE — PLAN OF CARE
Problem: Adult Behavioral Health Plan of Care  Goal: Optimized Coping Skills in Response to Life Stressors  Outcome: Ongoing, Progressing  Flowsheets (Taken 4/3/2024 1201)  Optimized Coping Skills in Response to Life Stressors: making progress toward outcome  Intervention: Promote Effective Coping Strategies  Flowsheets (Taken 4/3/2024 1201)  Supportive Measures:   active listening utilized   counseling provided   decision-making supported   goal-setting facilitated   positive reinforcement provided   problem-solving facilitated   relaxation techniques promoted   self-care encouraged   self-reflection promoted   verbalization of feelings encouraged   self-responsibility promoted  Goal: Develops/Participates in Therapeutic [place] to Support Successful Transition  Outcome: Ongoing, Progressing  Flowsheets (Taken 4/2/2024 1138)  Develops/Participates in Therapeutic [place] to Support Successful Transition: making progress toward outcome  Intervention: Foster Therapeutic [place]  Flowsheets (Taken 4/3/2024 1201)  Trust Relationship/Rapport:   care explained   empathic listening provided   reassurance provided   thoughts/feelings acknowledged   questions answered   choices provided   emotional support provided   questions encouraged  Intervention: Mutually Develop Transition Plan  Flowsheets  Taken 4/2/2024 1138 by Palak Herrera  Outpatient/Agency/Support Group Needs:   outpatient psychiatric care (specify)   outpatient counseling   outpatient medication management  Discharge Coordination/Progress: Patient has insurance and denies transportation concerns. Patient agreeable with discharge planning.  Transition Support:   crisis management plan verbalized   community resources reviewed   crisis management plan promoted   follow-up care coordinated   follow-up care discussed  Anticipated Discharge Disposition: home with family  Transportation Concerns: none  Current Discharge Risk: psychiatric illness  Concerns to be  Addressed:   coping/stress   mental health   suicidal  Readmission Within the Last 30 Days: no previous admission in last 30 days  Patient's Choice of Community Agency(s): [employer] [facility]  Offered/Gave Vendor List: no  Taken 4/1/2024 1719 by Yamilex Almanza, RN  Transportation Anticipated: family or friend will provide  Patient/Family Anticipated Services at Transition:   mental health services   outpatient care  Patient/Family Anticipates Transition to: home with family   Goal Outcome Evaluation:   Consent Given to Review Plan with: Jimi Weaver and Avelina Souza  Progress: improving  Outcome Evaluation: Therapist met with patient to review plan of care; patient agreeable       DATA:      Therapist discussed case with Dr. Tamez and met with patient today to review coping skills, review plan of care, and discuss discharge.     Clinical Maneuvering/Intervention:     Therapist assisted patient in processing above session content; acknowledged and normalized patient’s thoughts, feelings, and concerns.  Discussed the therapist/patient relationship and explain the parameters and limitations of relative confidentiality.  Also discussed the importance of active participation, and honesty to the treatment process.  Encouraged the patient to discuss/vent their feelings, frustrations, and fears concerning their ongoing medical issues and validated their feelings.     Allowed patient to freely discuss issues without interruption or judgment. Provided safe, confidential environment to facilitate the development of positive therapeutic relationship and encourage open, honest communication.      Therapist addressed discharge safety planning this date. Assisted patient in identifying risk factors which would indicate the need for higher level of care after discharge;  including thoughts to harm self or others and/or self-harming behavior. Encouraged patient to call 911, or present to the nearest emergency room should any of  these events occur. Discussed crisis intervention services and means to access.  Encouraged securing any objects of harm.    Therapist completed integrated summary, treatment plan, and initiated social history this date.  Therapist is strongly encouraging family involvement in treatment.       Encouraged mask wearing, social distancing, and regular hand washing due to COVID19 risk.      ASSESSMENT:      Therapist met 1:1 with patient today. Patient denies suicidal ideation. Patient denies homicidal ideation. Patient denies AVH. Patient is calm and cooperative with session. She is intermittently tearful and appears anxious. Patient continues to report heightened anxiety, mood lability, nightmares, brain fog, difficulty remembering things, and panic. Patient discussed several fears she has been ruminating on, including the fear that she won't get better, that she has permanently damaged her brain by using Delta 8, and that she will be like her father. Patient reports her father has bipolar disorder and she doesn't want to engage in similar behaviors as him. Patient reports she feels disconnected from her body and like she is out of control. She also reports feeling shameful about her symptoms and seems to be self-degrading. Therapist assisted patient with challenging some of her negative thoughts about herself and encouraged her to practice self compassion. Therapist offered emotional support and encouraged patient to practice grounding techniques. She is somewhat familiar with exercises and agreeable to practice skills.      PLAN:       Patient to remain hospitalized this date.      Treatment team will focus efforts on stabilizing patient's acute symptoms while providing education on healthy coping and crisis management to reduce hospitalizations.   Patient requires daily psychiatrist evaluation and 24/7 nursing supervision to promote patient  safety.     Therapist will offer 1-4 individual sessions, 1 therapy group  daily, family education, and appropriate referral.

## 2024-04-03 NOTE — PROGRESS NOTES
Pharmacy checked on price of Lybalvi. According to patient's plan, medication requires a prior authorization. PA has been submitted and approved through 4/3/2025. According to patient's plan, copay will be $0 for 1 month supply.    Thank you,    Lisa Mayorga, PharmD  04/03/24  08:34 EDT

## 2024-04-04 LAB
GEN 5 2HR TROPONIN T REFLEX: <6 NG/L
TROPONIN T DELTA: NORMAL
TROPONIN T SERPL HS-MCNC: <6 NG/L

## 2024-04-04 PROCEDURE — 99232 SBSQ HOSP IP/OBS MODERATE 35: CPT | Performed by: PSYCHIATRY & NEUROLOGY

## 2024-04-04 PROCEDURE — 93010 ELECTROCARDIOGRAM REPORT: CPT | Performed by: SPECIALIST

## 2024-04-04 PROCEDURE — 93005 ELECTROCARDIOGRAM TRACING: CPT | Performed by: PSYCHIATRY & NEUROLOGY

## 2024-04-04 PROCEDURE — 84484 ASSAY OF TROPONIN QUANT: CPT | Performed by: PSYCHIATRY & NEUROLOGY

## 2024-04-04 RX ADMIN — NICOTINE TRANSDERMAL SYSTEM 1 PATCH: 21 PATCH, EXTENDED RELEASE TRANSDERMAL at 08:24

## 2024-04-04 RX ADMIN — SERTRALINE 50 MG: 50 TABLET, FILM COATED ORAL at 06:05

## 2024-04-04 RX ADMIN — OLANZAPINE 5 MG: 5 TABLET, FILM COATED ORAL at 20:46

## 2024-04-04 RX ADMIN — TRAZODONE HYDROCHLORIDE 50 MG: 50 TABLET ORAL at 20:46

## 2024-04-04 RX ADMIN — HYDROXYZINE HYDROCHLORIDE 50 MG: 50 TABLET ORAL at 20:46

## 2024-04-04 NOTE — PLAN OF CARE
Goal Outcome Evaluation:   Consent Given to Review Plan with: Jimi Weaver and Avelina Souza  Progress: improving  Outcome Evaluation: Therapist met with patient to review plan of care; patient agreeable       DATA:      Therapist discussed case with RN and met with patient today to review coping skills, review plan of care, and discuss discharge.    Therapist provided the patient with psychoeducation and reading materials focused on grounding techniques, thought diffusion skills, and challenging worry.     Therapist contacted patient's mother to provide her with an update. She remains supportive.      Clinical Maneuvering/Intervention:     Therapist assisted patient in processing above session content; acknowledged and normalized patient’s thoughts, feelings, and concerns.  Discussed the therapist/patient relationship and explain the parameters and limitations of relative confidentiality.  Also discussed the importance of active participation, and honesty to the treatment process.  Encouraged the patient to discuss/vent their feelings, frustrations, and fears concerning their ongoing medical issues and validated their feelings.     Allowed patient to freely discuss issues without interruption or judgment. Provided safe, confidential environment to facilitate the development of positive therapeutic relationship and encourage open, honest communication.      Therapist addressed discharge safety planning this date. Assisted patient in identifying risk factors which would indicate the need for higher level of care after discharge;  including thoughts to harm self or others and/or self-harming behavior. Encouraged patient to call 911, or present to the nearest emergency room should any of these events occur. Discussed crisis intervention services and means to access.  Encouraged securing any objects of harm.    Therapist completed integrated summary, treatment plan, and initiated social history this date.  Therapist is  strongly encouraging family involvement in treatment.       Encouraged mask wearing, social distancing, and regular hand washing due to COVID19 risk.      ASSESSMENT:      Therapist met 1:1 with patient today. Patient denies suicidal ideation. Patient denies homicidal ideation. Patient denies AVH. Patient is calm and cooperative with session. Patient's affect is brighter and she is no longer tearful during sessions. She reports ongoing anxiety and worry related to her health. Patient reports feeling concerned about having chest pain earlier in the day. She denies any nightmares last night but reports difficulty falling asleep. Her rate and rhythm of speech are appropriate. She is linear and goal directed. Patient is hopeful to discharge home soon.     PLAN:       Patient to remain hospitalized this date.      Treatment team will focus efforts on stabilizing patient's acute symptoms while providing education on healthy coping and crisis management to reduce hospitalizations.   Patient requires daily psychiatrist evaluation and 24/7 nursing supervision to promote patient  safety.     Therapist will offer 1-4 individual sessions, 1 therapy group daily, family education, and appropriate referral.

## 2024-04-04 NOTE — PLAN OF CARE
Goal Outcome Evaluation:  Plan of Care Reviewed With: patient  Patient Agreement with Plan of Care: agrees     Progress: improving  Outcome Evaluation: Pt rated anxiety 2/10, depression 0/10, Pt denies SI/HI/AVH, reports poor sleep and good appetite.

## 2024-04-04 NOTE — NURSING NOTE
Pt complaining with chest pain v/s 144/89 and pulse 123. Stat EKG and troponin ordered per protocol. Pt also complaining of bleeding very heavily and had her IUD removed on 4/2. Md aware with new orders noted

## 2024-04-04 NOTE — PLAN OF CARE
Goal Outcome Evaluation:  Plan of Care Reviewed With: patient  Patient Agreement with Plan of Care: agrees     Progress: improving          Patient rates anxiety 4-5 and depression 0, denies thoughts of harming self and others, and denies hallucinations.

## 2024-04-04 NOTE — PROGRESS NOTES
"INPATIENT PSYCHIATRIC PROGRESS NOTE    Name:  Tamara Burger  :  2005  MRN:  [id number]  Visit Number:  [id number]  Length of stay:  3    SUBJECTIVE    CC/Focus of Exam: SI, bipolar disorder    INTERVAL HISTORY:  The patient reports she felt very anxious earlier when she was thinking about her health and things that are wrong with her, and she started experiencing chest pain and was worried she was having a heart attack. She states she worries about everything and at times it makes her physically sick.   Depression rating 0/10  Anxiety rating 3/10  Sleep: not well, was feeling anxious being alone  Withdrawal sx: None  Cravin/10    Review of Systems   HENT: Negative.     Respiratory: Negative.     Cardiovascular: Negative.    Gastrointestinal: Negative.        OBJECTIVE    Temp:  [97.2 °F (36.2 °C)-97.5 °F (36.4 °C)] 97.2 °F (36.2 °C)  Heart Rate:  [109-123] 123  Resp:  [18] 18  BP: (138-144)/(89-97) 144/89    MENTAL STATUS EXAM:  Appearance:Casually dressed, good hygeine.   Cooperation:Cooperative  Psychomotor: No psychomotor agitation/retardation, No EPS, No motor tics  Speech-normal rate, amount.  Mood \"anxious\"   Affect-congruent, appropriate, stable  Thought Content-goal directed, no delusional material present  Thought process-linear, organized.  Suicidality: No SI  Homicidality: No HI  Perception: No AH/VH  Insight-fair   Judgement-fair    Lab Results (last 24 hours)       Procedure Component Value Units Date/Time    High Sensitivity Troponin T [[id number]]  (Normal) Collected: 24    Specimen: Blood Updated: 24 1016     HS Troponin T <6 ng/L     Narrative:      High Sensitive Troponin T Reference Range:  <14.0 ng/L- Negative Female for AMI  <22.0 ng/L- Negative Male for AMI  >=14 - Abnormal Female indicating possible myocardial injury.  >=22 - Abnormal Male indicating possible myocardial injury.   Clinicians would have to utilize clinical acumen, EKG, Troponin, and serial changes to " determine if it is an Acute Myocardial Infarction or myocardial injury due to an underlying chronic condition.                    Imaging Results (Last 24 Hours)       Procedure Component Value Units Date/Time    US Non-ob Transvaginal [[id number]] Collected: 04/03/24 1350     Updated: 04/03/24 1353    Narrative:      EXAM:    US Pelvis Transabdominal and Transvaginal, Complete     EXAM DATE:    4/3/2024 1:33 PM     CLINICAL HISTORY:    pelvic pain, h/o ovarian cyst     TECHNIQUE:    Real-time complete transabdominal and transvaginal pelvic ultrasound  with image documentation.  Transvaginal imaging was used for better  evaluation of the endometrium and adnexa.     COMPARISON:    No relevant prior studies available.     FINDINGS:    UTERUS/CERVIX:  Unremarkable as visualized.  No myometrial mass.  The  uterus measures 5 x 2.5 x 3.5 cm.  The endometrial stripe measures 0.29  cm in thickness.    RIGHT OVARY:  Probable dermoid of the right ovary measuring 2.8 cm.  With some scattered specular echoes.  Normal blood flow.    LEFT OVARY:  Unremarkable as visualized.  Normal blood flow.  The left  ovary measures 2.1 x 1.6 cm.    FREE FLUID:  No free fluid.    BLADDER:  Unremarkable as visualized.  Wall is normal thickness for  degree of distention.       Impression:        Probable dermoid of the right ovary measuring 2.8 cm. With some  scattered specular echoes.        This report was finalized on 4/3/2024 1:51 PM by Dr. Farhan Gonzalez MD.                  ECG/EMG Results (most recent)       Procedure Component Value Units Date/Time    ECG 12 Lead Other; Baseline Cardiac Status [[id number]] Collected: 04/01/24 2119     Updated: 04/03/24 1142     QT Interval 358 ms      QTC Interval 440 ms     Narrative:      Test Reason : Other~  Blood Pressure :   */*   mmHG  Vent. Rate :  91 BPM     Atrial Rate :  91 BPM     P-R Int : 132 ms          QRS Dur :  84 ms      QT Int : 358 ms       P-R-T Axes :  39  81  57 degrees     QTc Int :  440 ms    Normal sinus rhythm  Normal ECG  No previous ECGs available  Confirmed by Loyd Naranjo (2028) on 4/3/2024 11:41:44 AM    Referred By:            Confirmed By: Loyd Naranjo    ECG 12 Lead Chest Pain [[id number]] Collected: 04/04/24 0921     Updated: 04/04/24 0922     QT Interval 338 ms      QTC Interval 427 ms     Narrative:      Test Reason : Chest Pain  Blood Pressure :   */*   mmHG  Vent. Rate :  96 BPM     Atrial Rate :  96 BPM     P-R Int : 128 ms          QRS Dur :  74 ms      QT Int : 338 ms       P-R-T Axes :  45  79  41 degrees     QTc Int : 427 ms    Normal sinus rhythm  Normal ECG  When compared with ECG of 01-APR-2024 21:19,  No significant change was found    Referred By:            Confirmed By:              ALLERGIES: Patient has no known allergies.    Medication Review:   Scheduled Medications:  nicotine, 1 patch, Transdermal, Q24H  OLANZapine, 5 mg, Oral, Nightly  sertraline, 50 mg, Oral, QAM         PRN Medications:    acetaminophen    aluminum-magnesium hydroxide-simethicone    benzonatate    benztropine **OR** benztropine    cetirizine    famotidine    hydrOXYzine    ibuprofen    loperamide    magnesium hydroxide    ondansetron ODT    sodium chloride    traZODone   All medications reviewed.    ASSESSMENT & PLAN:    Suicidal Ideation  -Recent concern for self-harm & suicide. Pt minimizing today, but family brought patient in due to safety concerns  -Admit for crisis stabilization  -SP3     Bipolar affective disorder, severe, recurrent, with possible psychosis  -Rule out MDD, substance induced d/o, anxiety d/o, side efx of contraception  -Began olanzapine 5mg nightly on 4/2/24.  -Increase sertraline to 75mg daily on 4/4/24.   -We will establish outpatient psychiatric care following hospitalization     Post Traumatic Stress Disorder  -Pt with hx of significant trauma, likely contributing to ongoing symptom burden  -Meds as above  -Outpatient care as above     Cannabis use disorder, severe,  dependence  -Admission UDS positive  -Supportive care  -Ascertain substance abuse treatment plans following discharge     Hyperglycemia  -Admission glucose 124  -A1c is 5.1     Contraceptive Therapy/Ovarian Cyst  -Appreciate OBGYN consult, IUD has been removed.     Special precautions: Special Precautions Level 3 (q15 min checks) .    Behavioral Health Treatment Plan and Problem List: I have reviewed and approved the Behavioral Health Treatment Plan and Problem list.  The patient has had a chance to review and agrees with the treatment plan.    Copied text in portions of this note has been reviewed and is accurate as of 04/04/24         Clinician:  Phyllis Gallagher MD  04/04/24  11:12 EDT

## 2024-04-04 NOTE — PLAN OF CARE
Goal Outcome Evaluation:               Patient complains of chest pain care plan updated to reflect changes in condition. Sera BARKER notified Dr. Gallagher

## 2024-04-05 VITALS
DIASTOLIC BLOOD PRESSURE: 84 MMHG | TEMPERATURE: 97 F | RESPIRATION RATE: 18 BRPM | SYSTOLIC BLOOD PRESSURE: 127 MMHG | OXYGEN SATURATION: 98 % | HEART RATE: 129 BPM

## 2024-04-05 LAB
QT INTERVAL: 338 MS
QTC INTERVAL: 427 MS

## 2024-04-05 PROCEDURE — 99238 HOSP IP/OBS DSCHRG MGMT 30/<: CPT | Performed by: PSYCHIATRY & NEUROLOGY

## 2024-04-05 RX ORDER — SERTRALINE HYDROCHLORIDE 25 MG/1
75 TABLET, FILM COATED ORAL EVERY MORNING
Qty: 90 TABLET | Refills: 0 | Status: SHIPPED | OUTPATIENT
Start: 2024-04-06

## 2024-04-05 RX ORDER — OLANZAPINE 5 MG/1
5 TABLET ORAL NIGHTLY
Qty: 30 TABLET | Refills: 0 | Status: SHIPPED | OUTPATIENT
Start: 2024-04-05 | End: 2024-04-05

## 2024-04-05 RX ORDER — OLANZAPINE AND SAMIDORPHAN L-MALATE 5; 10 MG/1; MG/1
1 TABLET, FILM COATED ORAL NIGHTLY
Qty: 30 TABLET | Refills: 0 | Status: SHIPPED | OUTPATIENT
Start: 2024-04-05

## 2024-04-05 RX ADMIN — NICOTINE TRANSDERMAL SYSTEM 1 PATCH: 21 PATCH, EXTENDED RELEASE TRANSDERMAL at 08:23

## 2024-04-05 RX ADMIN — HYDROXYZINE HYDROCHLORIDE 50 MG: 50 TABLET ORAL at 08:23

## 2024-04-05 RX ADMIN — SERTRALINE 75 MG: 50 TABLET, FILM COATED ORAL at 08:23

## 2024-04-05 NOTE — PROGRESS NOTES
Discharge Note:     On date of discharge, patient is calm and cooperative. Patient denies SI/HI/AVH. Patient reports improvement in mood and symptoms during hospitalization. Psychosis and paranoia are resolved. Patient's anxiety has fluctuated during hospitalization, but she has not experienced any panic attacks and she reports ongoing anxiety is related to hospitalization. Patient is future oriented, looking forward to returning home with family. Therapist spoke with patient's mother and provided her with an update. Avelina reports the patient will spend a few days with her prior to returning to her home with her fiance. Safety planning was completed with Avelina and she is agreeable to complete safety planning with patient's fiance. Patient is able to identify protective factors and healthy coping skills. Patient is agreeable to return to the nearest ED/contact 911 if they experience SI/HI/AVH. Patient denies needs or concerns prior to discharge today.

## 2024-04-05 NOTE — PAYOR COMM NOTE
"Eze Fernandez (18 y.o. Female)       Date of Birth   2005    Social Security Number       Address   [address] [place] [address]    Home Phone   [phone]    MRN   [id number]       Gnosticism   Rastafari    Marital Status   Single                            Admission Date   4/1/24    Admission Type   Emergency    Admitting Provider   Christopher Tamez MD    Attending Provider       Department, Room/Bed   [facility] ADULT PSYCHIATRIC, 1015/1S       Discharge Date   4/5/2024    Discharge Disposition   Home or Self Care    Discharge Destination                                 Attending Provider: (none)   Allergies: No Known Allergies    Isolation: None   Infection: None   Code Status: CPR    Ht: 165.1 cm (65\")   Wt: 77.1 kg (170 lb)    Admission Cmt: None   Principal Problem: Suicidal ideations [R45.851]                   Active Insurance as of 4/1/2024       Primary Coverage       Payor Plan Insurance Group Employer/Plan Group    [place] [id number]       Payor Plan Address Payor Plan Phone Number Payor Plan Fax Number Effective Dates    [employer] [address] [address] [phone]  11/29/2022 - None Entered    [facility] [id number]         Subscriber Name Subscriber Birth Date Member ID       EZE FERNANDEZ 2005 [id number]                     Emergency Contacts        (Rel.) Home Phone Work Phone Mobile Phone    AARON VELASCO (Mother) [phone] -- --          PLEASE ATTACH THIS DISCHARGE INFORMATION TO NOTIFICATION # [id number]    ADMISSION DATE:  4/1/2024  DISCHARGE DATE:  4/5/2024  DISCHARGE DIAGNOSIS:  Principal Problem:   Suicidal ideations  Bipolar affective disorder, severe, recurrent, with possible psychosis   Post Traumatic Stress Disorder (F43.10)  Cannabis use disorder, severe, dependence (F12.20)    AFTERCARE:  [facility]  [address]  [place] [id number] [phone]  **[facility] " building      at 9:00am with Deysi      [id number] [facility] [address]  [place] [id number] [phone]     May 6 at 9:00am with Cheyanne   May 21 at 8:45am with Duyen      DISCHARGE SUMMARY:     Phyllis Gallagher MD   Physician  Psychiatry     Discharge Summary      Signed     Date of Service: 24  Creation Time: 24     Signed         :  2005  MRN:  [id number]  Visit Number:  [id number]        Date of Admission:2024   Date of Discharge:  2024     Discharge Diagnosis:  Principal Problem:    Suicidal ideations  Bipolar affective disorder, severe, recurrent, with possible psychosis   Post Traumatic Stress Disorder   Cannabis use disorder, severe, dependence      Admission Diagnosis:  Suicidal ideations [R45.851]        HPI  Tamara Burger is a 18 y.o. female who was admitted on 2024 with complaints of possible psychosis, anxiety, mood lability, poor reality testing, mild disorganization.   For details please see H&P dated 24.      Hospital Course  Patient is a 18 y.o. female presented with psychosis, anxiety, mood lability, confusion and suicidal ideations. The patient appeared to be have bipolar disorder along with PTSD and her ongoing use of delta 8 thc appeared to make her paranoid. The patient was admitted to the adult psych unit for safety, further evaluation and treatment. The patient was started on  olanzapine for her symptoms  of psychosis and dose of sertraline was increased to 50 mg daily. The patient reported she had an IUD placed in Oct last year and she felt her emotions were worse after its placement. OBGYN consult was done and patient's IUD was removed. The reported feeling better after its removal. The patient was no longer experiencing any psychosis or paranoia but felt that her anxiety was getting worse being in the hospital and felt ready to go home. Safety planning was done with her fiance and she was then discharged.       Mental  "Status Exam upon discharge:   Mood \"mellowed out\"   Affect-congruent, appropriate, stable  Thought Content-goal directed, no delusional material present  Thought process-linear, organized.  Suicidality: No SI  Homicidality: No HI  Perception: No AH/VH     Procedures Performed        Consults:   Consults         Date and Time Order Name Status Description     4/2/2024 10:52 AM Inpatient Obstetrics / Gynecology Consult Completed                  Pertinent Test Results:           Admission on 04/01/2024   Component Date Value Ref Range Status    Hepatitis B Surface Ag 04/01/2024 Non-Reactive  Non-Reactive Final    Hep A IgM 04/01/2024 Non-Reactive  Non-Reactive Final    Hep B C IgM 04/01/2024 Non-Reactive  Non-Reactive Final    Hepatitis C Ab 04/01/2024 Non-Reactive  Non-Reactive Final    QT Interval 04/01/2024 358  ms Final    QTC Interval 04/01/2024 440  ms Final    Glucose 04/01/2024 131 (H)  70 - 130 mg/dL Final    Hemoglobin A1C 04/01/2024 5.10  4.80 - 5.60 % Final    TSH 04/01/2024 0.965  0.270 - 4.200 uIU/mL Final    QT Interval 04/04/2024 338  ms Final    QTC Interval 04/04/2024 427  ms Final    HS Troponin T 04/04/2024 <6  <14 ng/L Final    HS Troponin T 04/04/2024 <6  <14 ng/L Final    Troponin T Delta 04/04/2024     Final     Unable to calculate.   Admission on 04/01/2024, Discharged on 04/01/2024   Component Date Value Ref Range Status    HCG Qualitative 04/01/2024 Negative  Negative Final    Glucose 04/01/2024 124 (H)  65 - 99 mg/dL Final    BUN 04/01/2024 11  6 - 20 mg/dL Final    Creatinine 04/01/2024 0.68  0.57 - 1.00 mg/dL Final    Sodium 04/01/2024 141  136 - 145 mmol/L Final    Potassium 04/01/2024 3.2 (L)  3.5 - 5.2 mmol/L Final    Chloride 04/01/2024 104  98 - 107 mmol/L Final    CO2 04/01/2024 22.1  22.0 - 29.0 mmol/L Final    Calcium 04/01/2024 9.6  8.6 - 10.5 mg/dL Final    Total Protein 04/01/2024 7.9  6.0 - 8.5 g/dL Final    Albumin 04/01/2024 4.8  3.5 - 5.2 g/dL Final    ALT (SGPT) 04/01/2024 " 29  1 - 33 U/L Final    AST (SGOT) 04/01/2024 20  1 - 32 U/L Final    Alkaline Phosphatase 04/01/2024 74  39 - 117 U/L Final    Total Bilirubin 04/01/2024 0.2  0.0 - 1.2 mg/dL Final    Globulin 04/01/2024 3.1  gm/dL Final    A/G Ratio 04/01/2024 1.5  g/dL Final    BUN/Creatinine Ratio 04/01/2024 16.2  7.0 - 25.0 Final    Anion Gap 04/01/2024 14.9  5.0 - 15.0 mmol/L Final    eGFR 04/01/2024 128.8  >60.0 mL/min/1.73 Final    Color, UA 04/01/2024 Yellow  Yellow, Straw Final    Appearance, UA 04/01/2024 Clear  Clear Final    pH, UA 04/01/2024 6.0  5.0 - 8.0 Final    Specific Gravity, UA 04/01/2024 1.017  1.005 - 1.030 Final    Glucose, UA 04/01/2024 Negative  Negative Final    Ketones, UA 04/01/2024 Negative  Negative Final    Bilirubin, UA 04/01/2024 Negative  Negative Final    Blood, UA 04/01/2024 Small (1+) (A)  Negative Final    Protein, UA 04/01/2024 Negative  Negative Final    Leuk Esterase, UA 04/01/2024 Negative  Negative Final    Nitrite, UA 04/01/2024 Negative  Negative Final    Urobilinogen, UA 04/01/2024 0.2 E.U./dL  0.2 - 1.0 E.U./dL Final    THC, Screen, Urine 04/01/2024 Positive (A)  Negative Final    Phencyclidine (PCP), Urine 04/01/2024 Negative  Negative Final    Cocaine Screen, Urine 04/01/2024 Negative  Negative Final    Methamphetamine, Ur 04/01/2024 Negative  Negative Final    Opiate Screen 04/01/2024 Negative  Negative Final    Amphetamine Screen, Urine 04/01/2024 Negative  Negative Final    Benzodiazepine Screen, Urine 04/01/2024 Positive (A)  Negative Final    Tricyclic Antidepressants Screen 04/01/2024 Negative  Negative Final    Methadone Screen, Urine 04/01/2024 Negative  Negative Final    Barbiturates Screen, Urine 04/01/2024 Negative  Negative Final    Oxycodone Screen, Urine 04/01/2024 Negative  Negative Final    Buprenorphine, Screen, Urine 04/01/2024 Negative  Negative Final    Magnesium 04/01/2024 1.9  1.7 - 2.2 mg/dL Final    Ethanol 04/01/2024 <10  0 - 10 mg/dL Final    Ethanol %  04/01/2024 <0.010  % Final    WBC 04/01/2024 10.55  3.40 - 10.80 10*3/mm3 Final    RBC 04/01/2024 4.41  3.77 - 5.28 10*6/mm3 Final    Hemoglobin 04/01/2024 12.5  12.0 - 15.9 g/dL Final    Hematocrit 04/01/2024 39.3  34.0 - 46.6 % Final    MCV 04/01/2024 89.1  79.0 - 97.0 fL Final    MCH 04/01/2024 28.3  26.6 - 33.0 pg Final    MCHC 04/01/2024 31.8  31.5 - 35.7 g/dL Final    RDW 04/01/2024 15.5 (H)  12.3 - 15.4 % Final    RDW-SD 04/01/2024 50.8  37.0 - 54.0 fl Final    MPV 04/01/2024 9.8  6.0 - 12.0 fL Final    Platelets 04/01/2024 454 (H)  140 - 450 10*3/mm3 Final    Neutrophil % 04/01/2024 79.3 (H)  42.7 - 76.0 % Final    Lymphocyte % 04/01/2024 14.7 (L)  19.6 - 45.3 % Final    Monocyte % 04/01/2024 4.8 (L)  5.0 - 12.0 % Final    Eosinophil % 04/01/2024 0.4  0.3 - 6.2 % Final    Basophil % 04/01/2024 0.5  0.0 - 1.5 % Final    Immature Grans % 04/01/2024 0.3  0.0 - 0.5 % Final    Neutrophils, Absolute 04/01/2024 8.37 (H)  1.70 - 7.00 10*3/mm3 Final    Lymphocytes, Absolute 04/01/2024 1.55  0.70 - 3.10 10*3/mm3 Final    Monocytes, Absolute 04/01/2024 0.51  0.10 - 0.90 10*3/mm3 Final    Eosinophils, Absolute 04/01/2024 0.04  0.00 - 0.40 10*3/mm3 Final    Basophils, Absolute 04/01/2024 0.05  0.00 - 0.20 10*3/mm3 Final    Immature Grans, Absolute 04/01/2024 0.03  0.00 - 0.05 10*3/mm3 Final    nRBC 04/01/2024 0.0  0.0 - 0.2 /100 WBC Final    Fentanyl, Urine 04/01/2024 Negative  Negative Final    RBC, UA 04/01/2024 0-2  None Seen, 0-2 /HPF Final    WBC, UA 04/01/2024 0-2  None Seen, 0-2 /HPF Final    Bacteria, UA 04/01/2024 None Seen  None Seen /HPF Final    Squamous Epithelial Cells, UA 04/01/2024 0-2  None Seen, 0-2 /HPF Final    Hyaline Casts, UA 04/01/2024 None Seen  None Seen /LPF Final    Methodology 04/01/2024 Automated Microscopy    Final         Condition on Discharge:  improved     Vital Signs  Temp:  [97 °F (36.1 °C)-98.9 °F (37.2 °C)] 97 °F (36.1 °C)  Heart Rate:  [102-129] 129  Resp:  [18] 18  BP:  (127-130)/84-96) 127/84        Discharge Disposition:  Home or Self Care     Discharge Medications:      Discharge Medications          New Medications         Instructions Start Date   OLANZapine 5 MG tablet  Commonly known as: zyPREXA    5 mg, Oral, Nightly                  Changes to Medications         Instructions Start Date   sertraline 25 MG tablet  Commonly known as: ZOLOFT  What changed: how much to take    75 mg, Oral, Every Morning    Start Date: April 6, 2024                Continue These Medications         Instructions Start Date   cetirizine 10 MG tablet  Commonly known as: zyrTEC    10 mg, Oral, Daily PRN        hydrOXYzine pamoate 25 MG capsule  Commonly known as: VISTARIL    25 mg, Oral, Daily PRN                  Stop These Medications       cefdinir 300 MG capsule  Commonly known as: OMNICEF                   Discharge Diet: Regular       Activity at Discharge: As tolerated      Follow-up Appointments     [facility]  [address]  [place] [address]   [phone]  **[facility]     April 6 at 9:00am with [facility]  [address]  [place] [address]  [phone]     May 6 at 9:00am with Cheyanne   May 21 at 8:45am with Duyen         Time: I spent  < 30  minutes on this discharge activity which included: face-to-face encounter with the patient, reviewing the data in the system, coordination of the care with the nursing staff as well as consultants, documentation, and entering orders.          Clinician:   Phyllis Gallagher MD  04/05/24  11:02 EDT

## 2024-04-05 NOTE — DISCHARGE SUMMARY
":  2005  MRN:  [id number]  Visit Number:  [id number]      Date of Admission:2024   Date of Discharge:  2024    Discharge Diagnosis:  Principal Problem:    Suicidal ideations  Bipolar affective disorder, severe, recurrent, with possible psychosis   Post Traumatic Stress Disorder   Cannabis use disorder, severe, dependence     Admission Diagnosis:  Suicidal ideations [R45.851]     HPI  Tamara Burger is a 18 y.o. female who was admitted on 2024 with complaints of possible psychosis, anxiety, mood lability, poor reality testing, mild disorganization.   For details please see H&P dated 24.     Hospital Course  Patient is a 18 y.o. female presented with psychosis, anxiety, mood lability, confusion and suicidal ideations. The patient appeared to be have bipolar disorder along with PTSD and her ongoing use of delta 8 thc appeared to make her paranoid. The patient was admitted to the adult psych unit for safety, further evaluation and treatment. The patient was started on  olanzapine for her symptoms  of psychosis and dose of sertraline was increased to 50 mg daily. The patient reported she had an IUD placed in Oct last year and she felt her emotions were worse after its placement. OBGYN consult was done and patient's IUD was removed. The reported feeling better after its removal. The patient was no longer experiencing any psychosis or paranoia but felt that her anxiety was getting worse being in the hospital and felt ready to go home. Safety planning was done with her fiance and she was then discharged.      Mental Status Exam upon discharge:   Mood \"mellowed out\"   Affect-congruent, appropriate, stable  Thought Content-goal directed, no delusional material present  Thought process-linear, organized.  Suicidality: No SI  Homicidality: No HI  Perception: No AH/VH    Procedures Performed         Consults:   Consults       Date and Time Order Name Status Description    2024 10:52 AM Inpatient " Obstetrics / Gynecology Consult Completed             Pertinent Test Results:   Admission on 04/01/2024   Component Date Value Ref Range Status    Hepatitis B Surface Ag 04/01/2024 Non-Reactive  Non-Reactive Final    Hep A IgM 04/01/2024 Non-Reactive  Non-Reactive Final    Hep B C IgM 04/01/2024 Non-Reactive  Non-Reactive Final    Hepatitis C Ab 04/01/2024 Non-Reactive  Non-Reactive Final    QT Interval 04/01/2024 358  ms Final    QTC Interval 04/01/2024 440  ms Final    Glucose 04/01/2024 131 (H)  70 - 130 mg/dL Final    Hemoglobin A1C 04/01/2024 5.10  4.80 - 5.60 % Final    TSH 04/01/2024 0.965  0.270 - 4.200 uIU/mL Final    QT Interval 04/04/2024 338  ms Final    QTC Interval 04/04/2024 427  ms Final    HS Troponin T 04/04/2024 <6  <14 ng/L Final    HS Troponin T 04/04/2024 <6  <14 ng/L Final    Troponin T Delta 04/04/2024    Final    Unable to calculate.   Admission on 04/01/2024, Discharged on 04/01/2024   Component Date Value Ref Range Status    HCG Qualitative 04/01/2024 Negative  Negative Final    Glucose 04/01/2024 124 (H)  65 - 99 mg/dL Final    BUN 04/01/2024 11  6 - 20 mg/dL Final    Creatinine 04/01/2024 0.68  0.57 - 1.00 mg/dL Final    Sodium 04/01/2024 141  136 - 145 mmol/L Final    Potassium 04/01/2024 3.2 (L)  3.5 - 5.2 mmol/L Final    Chloride 04/01/2024 104  98 - 107 mmol/L Final    CO2 04/01/2024 22.1  22.0 - 29.0 mmol/L Final    Calcium 04/01/2024 9.6  8.6 - 10.5 mg/dL Final    Total Protein 04/01/2024 7.9  6.0 - 8.5 g/dL Final    Albumin 04/01/2024 4.8  3.5 - 5.2 g/dL Final    ALT (SGPT) 04/01/2024 29  1 - 33 U/L Final    AST (SGOT) 04/01/2024 20  1 - 32 U/L Final    Alkaline Phosphatase 04/01/2024 74  39 - 117 U/L Final    Total Bilirubin 04/01/2024 0.2  0.0 - 1.2 mg/dL Final    Globulin 04/01/2024 3.1  gm/dL Final    A/G Ratio 04/01/2024 1.5  g/dL Final    BUN/Creatinine Ratio 04/01/2024 16.2  7.0 - 25.0 Final    Anion Gap 04/01/2024 14.9  5.0 - 15.0 mmol/L Final    eGFR 04/01/2024 128.8   >60.0 mL/min/1.73 Final    Color, UA 04/01/2024 Yellow  Yellow, Straw Final    Appearance, UA 04/01/2024 Clear  Clear Final    pH, UA 04/01/2024 6.0  5.0 - 8.0 Final    Specific Gravity, UA 04/01/2024 1.017  1.005 - 1.030 Final    Glucose, UA 04/01/2024 Negative  Negative Final    Ketones, UA 04/01/2024 Negative  Negative Final    Bilirubin, UA 04/01/2024 Negative  Negative Final    Blood, UA 04/01/2024 Small (1+) (A)  Negative Final    Protein, UA 04/01/2024 Negative  Negative Final    Leuk Esterase, UA 04/01/2024 Negative  Negative Final    Nitrite, UA 04/01/2024 Negative  Negative Final    Urobilinogen, UA 04/01/2024 0.2 E.U./dL  0.2 - 1.0 E.U./dL Final    THC, Screen, Urine 04/01/2024 Positive (A)  Negative Final    Phencyclidine (PCP), Urine 04/01/2024 Negative  Negative Final    Cocaine Screen, Urine 04/01/2024 Negative  Negative Final    Methamphetamine, Ur 04/01/2024 Negative  Negative Final    Opiate Screen 04/01/2024 Negative  Negative Final    Amphetamine Screen, Urine 04/01/2024 Negative  Negative Final    Benzodiazepine Screen, Urine 04/01/2024 Positive (A)  Negative Final    Tricyclic Antidepressants Screen 04/01/2024 Negative  Negative Final    Methadone Screen, Urine 04/01/2024 Negative  Negative Final    Barbiturates Screen, Urine 04/01/2024 Negative  Negative Final    Oxycodone Screen, Urine 04/01/2024 Negative  Negative Final    Buprenorphine, Screen, Urine 04/01/2024 Negative  Negative Final    Magnesium 04/01/2024 1.9  1.7 - 2.2 mg/dL Final    Ethanol 04/01/2024 <10  0 - 10 mg/dL Final    Ethanol % 04/01/2024 <0.010  % Final    WBC 04/01/2024 10.55  3.40 - 10.80 10*3/mm3 Final    RBC 04/01/2024 4.41  3.77 - 5.28 10*6/mm3 Final    Hemoglobin 04/01/2024 12.5  12.0 - 15.9 g/dL Final    Hematocrit 04/01/2024 39.3  34.0 - 46.6 % Final    MCV 04/01/2024 89.1  79.0 - 97.0 fL Final    MCH 04/01/2024 28.3  26.6 - 33.0 pg Final    MCHC 04/01/2024 31.8  31.5 - 35.7 g/dL Final    RDW 04/01/2024 15.5 (H)   12.3 - 15.4 % Final    RDW-SD 04/01/2024 50.8  37.0 - 54.0 fl Final    MPV 04/01/2024 9.8  6.0 - 12.0 fL Final    Platelets 04/01/2024 454 (H)  140 - 450 10*3/mm3 Final    Neutrophil % 04/01/2024 79.3 (H)  42.7 - 76.0 % Final    Lymphocyte % 04/01/2024 14.7 (L)  19.6 - 45.3 % Final    Monocyte % 04/01/2024 4.8 (L)  5.0 - 12.0 % Final    Eosinophil % 04/01/2024 0.4  0.3 - 6.2 % Final    Basophil % 04/01/2024 0.5  0.0 - 1.5 % Final    Immature Grans % 04/01/2024 0.3  0.0 - 0.5 % Final    Neutrophils, Absolute 04/01/2024 8.37 (H)  1.70 - 7.00 10*3/mm3 Final    Lymphocytes, Absolute 04/01/2024 1.55  0.70 - 3.10 10*3/mm3 Final    Monocytes, Absolute 04/01/2024 0.51  0.10 - 0.90 10*3/mm3 Final    Eosinophils, Absolute 04/01/2024 0.04  0.00 - 0.40 10*3/mm3 Final    Basophils, Absolute 04/01/2024 0.05  0.00 - 0.20 10*3/mm3 Final    Immature Grans, Absolute 04/01/2024 0.03  0.00 - 0.05 10*3/mm3 Final    nRBC 04/01/2024 0.0  0.0 - 0.2 /100 WBC Final    Fentanyl, Urine 04/01/2024 Negative  Negative Final    RBC, UA 04/01/2024 0-2  None Seen, 0-2 /HPF Final    WBC, UA 04/01/2024 0-2  None Seen, 0-2 /HPF Final    Bacteria, UA 04/01/2024 None Seen  None Seen /HPF Final    Squamous Epithelial Cells, UA 04/01/2024 0-2  None Seen, 0-2 /HPF Final    Hyaline Casts, UA 04/01/2024 None Seen  None Seen /LPF Final    Methodology 04/01/2024 Automated Microscopy   Final        Condition on Discharge:  improved    Vital Signs  Temp:  [97 °F (36.1 °C)-98.9 °F (37.2 °C)] 97 °F (36.1 °C)  Heart Rate:  [102-129] 129  Resp:  [18] 18  BP: (127-130)/(84-96) 127/84      Discharge Disposition:  Home or Self Care    Discharge Medications:     Discharge Medications        New Medications        Instructions Start Date   OLANZapine 5 MG tablet  Commonly known as: zyPREXA   5 mg, Oral, Nightly             Changes to Medications        Instructions Start Date   sertraline 25 MG tablet  Commonly known as: ZOLOFT  What changed: how much to take   75 mg,  Oral, Every Morning   Start Date: April 6, 2024            Continue These Medications        Instructions Start Date   cetirizine 10 MG tablet  Commonly known as: zyrTEC   10 mg, Oral, Daily PRN      hydrOXYzine pamoate 25 MG capsule  Commonly known as: VISTARIL   25 mg, Oral, Daily PRN             Stop These Medications      cefdinir 300 MG capsule  Commonly known as: OMNICEF              Discharge Diet: Regular      Activity at Discharge: As tolerated     Follow-up Appointments    [facility]  [address]  [place] [address]   [phone]  [facility]     April 6 at 9:00am with [facility]  [address]  [place] [address]  [phone]     May 6 at 9:00am with Cheyanne   May 21 at 8:45am with Duyen       Time: I spent  < 30  minutes on this discharge activity which included: face-to-face encounter with the patient, reviewing the data in the system, coordination of the care with the nursing staff as well as consultants, documentation, and entering orders.        Clinician:   Phyllis Gallagher MD  04/05/24  11:02 EDT

## 2024-04-05 NOTE — PLAN OF CARE
Goal Outcome Evaluation:  Plan of Care Reviewed With: patient  Patient Agreement with Plan of Care: agrees     Progress: improving  Outcome Evaluation: Pt calm and cooperative during assessment, Pt wants to go home. Rated anxiety 4/10, depression 1/10, denied SI/HI/AVH. poor sleep and good appetite.